# Patient Record
Sex: FEMALE | Race: WHITE | NOT HISPANIC OR LATINO | URBAN - METROPOLITAN AREA
[De-identification: names, ages, dates, MRNs, and addresses within clinical notes are randomized per-mention and may not be internally consistent; named-entity substitution may affect disease eponyms.]

---

## 2017-05-11 ENCOUNTER — OUTPATIENT (OUTPATIENT)
Dept: OUTPATIENT SERVICES | Facility: HOSPITAL | Age: 57
LOS: 1 days | End: 2017-05-11
Payer: COMMERCIAL

## 2017-05-11 PROCEDURE — 72100 X-RAY EXAM L-S SPINE 2/3 VWS: CPT

## 2017-05-11 PROCEDURE — 72100 X-RAY EXAM L-S SPINE 2/3 VWS: CPT | Mod: 26

## 2017-09-26 ENCOUNTER — OUTPATIENT (OUTPATIENT)
Dept: OUTPATIENT SERVICES | Facility: HOSPITAL | Age: 57
LOS: 1 days | End: 2017-09-26
Payer: COMMERCIAL

## 2017-09-26 DIAGNOSIS — Z96.649 PRESENCE OF UNSPECIFIED ARTIFICIAL HIP JOINT: Chronic | ICD-10-CM

## 2017-09-26 DIAGNOSIS — J35.1 HYPERTROPHY OF TONSILS: Chronic | ICD-10-CM

## 2017-09-26 DIAGNOSIS — Z90.49 ACQUIRED ABSENCE OF OTHER SPECIFIED PARTS OF DIGESTIVE TRACT: Chronic | ICD-10-CM

## 2017-09-26 DIAGNOSIS — Q25.0 PATENT DUCTUS ARTERIOSUS: Chronic | ICD-10-CM

## 2017-09-26 DIAGNOSIS — Z90.721 ACQUIRED ABSENCE OF OVARIES, UNILATERAL: Chronic | ICD-10-CM

## 2017-09-26 PROCEDURE — 86850 RBC ANTIBODY SCREEN: CPT

## 2017-09-26 PROCEDURE — 86900 BLOOD TYPING SEROLOGIC ABO: CPT

## 2017-09-26 PROCEDURE — 87641 MR-STAPH DNA AMP PROBE: CPT

## 2017-09-26 PROCEDURE — 86901 BLOOD TYPING SEROLOGIC RH(D): CPT

## 2018-01-09 VITALS
OXYGEN SATURATION: 96 % | DIASTOLIC BLOOD PRESSURE: 80 MMHG | HEIGHT: 63 IN | WEIGHT: 136.91 LBS | HEART RATE: 92 BPM | TEMPERATURE: 98 F | SYSTOLIC BLOOD PRESSURE: 131 MMHG | RESPIRATION RATE: 16 BRPM

## 2018-01-09 NOTE — H&P ADULT - NSHPPHYSICALEXAM_GEN_ALL_CORE
Back decreased ROM secondary to pain  Rest of PE per MD clearance PE: A+O x 3  Normal head, atraumatic. Normal skin, no rashes/lesions/erythema.    MSK: Back decreased ROM secondary to pain: No deformity or open wounds. No rashes or lesions. left EHL/TA/GS 4/5, right EHL/TA/GS: 5/5. Sensation intact and equal BLE. Skin warm and well perfused. DP palpable BLE. Cap refill brisk.     Rest of PE per MD clearance

## 2018-01-09 NOTE — PATIENT PROFILE ADULT. - PMH
Deep vein thrombosis (DVT)  after colon surgery  History of colon cancer  2007 Chemo  HTN (hypertension)    Osteopenia

## 2018-01-09 NOTE — H&P ADULT - NSHPLABSRESULTS_GEN_ALL_CORE
Preop cbc, bmp, pt/inr, ptt wnl; nasal swab dos; UA dos  preop cxr wnl per clearance  preop ekg wnl per clearance

## 2018-01-09 NOTE — PATIENT PROFILE ADULT. - PSH
History of oophorectomy  bilateral 2007 ( preventative)  History of spinal surgery  2004  History of strabismus surgery    History of tonsillectomy    History of total hip replacement  right  PDA (patent ductus arteriosus)  age 9  S/P partial colectomy  2007

## 2018-01-09 NOTE — H&P ADULT - HISTORY OF PRESENT ILLNESS
57F Hx PSF L4-5 c/o back pain  Presents today for Lumbar BETY/Reinstrumentation L4-5 with PSF/TLIF. 57F Hx PSF L4-5 c/o back pain x 4 months that is progressively worsening. Pain radiates to left buttock and calf, denies numbness/tingling. Patient is an independent ambulator. Patient states she had initial lumbar spine surgery in 2005 with relief of pain, current pain began on its own without any trauma at onset. Patient seen sitting in chair this morning in no acute distress Denies f/c/n/v/c/d, or hx of dvt. States she feels otherwise well.     Presents today for Lumbar BETY/Reinstrumentation L4-5 with PSF/TLIF.

## 2018-01-10 ENCOUNTER — INPATIENT (INPATIENT)
Facility: HOSPITAL | Age: 58
LOS: 2 days | Discharge: HOME | DRG: 460 | End: 2018-01-13
Attending: ORTHOPAEDIC SURGERY | Admitting: ORTHOPAEDIC SURGERY
Payer: COMMERCIAL

## 2018-01-10 DIAGNOSIS — Z98.1 ARTHRODESIS STATUS: ICD-10-CM

## 2018-01-10 DIAGNOSIS — Z98.890 OTHER SPECIFIED POSTPROCEDURAL STATES: Chronic | ICD-10-CM

## 2018-01-10 DIAGNOSIS — Z88.2 ALLERGY STATUS TO SULFONAMIDES: ICD-10-CM

## 2018-01-10 DIAGNOSIS — Z85.038 PERSONAL HISTORY OF OTHER MALIGNANT NEOPLASM OF LARGE INTESTINE: ICD-10-CM

## 2018-01-10 DIAGNOSIS — Z88.0 ALLERGY STATUS TO PENICILLIN: ICD-10-CM

## 2018-01-10 DIAGNOSIS — Z96.641 PRESENCE OF RIGHT ARTIFICIAL HIP JOINT: ICD-10-CM

## 2018-01-10 DIAGNOSIS — M54.16 RADICULOPATHY, LUMBAR REGION: ICD-10-CM

## 2018-01-10 DIAGNOSIS — E53.8 DEFICIENCY OF OTHER SPECIFIED B GROUP VITAMINS: ICD-10-CM

## 2018-01-10 DIAGNOSIS — M85.80 OTHER SPECIFIED DISORDERS OF BONE DENSITY AND STRUCTURE, UNSPECIFIED SITE: ICD-10-CM

## 2018-01-10 DIAGNOSIS — I10 ESSENTIAL (PRIMARY) HYPERTENSION: ICD-10-CM

## 2018-01-10 DIAGNOSIS — Z90.721 ACQUIRED ABSENCE OF OVARIES, UNILATERAL: Chronic | ICD-10-CM

## 2018-01-10 DIAGNOSIS — Q25.0 PATENT DUCTUS ARTERIOSUS: Chronic | ICD-10-CM

## 2018-01-10 DIAGNOSIS — Z96.649 PRESENCE OF UNSPECIFIED ARTIFICIAL HIP JOINT: Chronic | ICD-10-CM

## 2018-01-10 DIAGNOSIS — M54.9 DORSALGIA, UNSPECIFIED: ICD-10-CM

## 2018-01-10 DIAGNOSIS — Z90.89 ACQUIRED ABSENCE OF OTHER ORGANS: Chronic | ICD-10-CM

## 2018-01-10 DIAGNOSIS — Z90.49 ACQUIRED ABSENCE OF OTHER SPECIFIED PARTS OF DIGESTIVE TRACT: Chronic | ICD-10-CM

## 2018-01-10 DIAGNOSIS — Z86.718 PERSONAL HISTORY OF OTHER VENOUS THROMBOSIS AND EMBOLISM: ICD-10-CM

## 2018-01-10 LAB
ANION GAP SERPL CALC-SCNC: 13 MMOL/L — SIGNIFICANT CHANGE UP (ref 5–17)
APPEARANCE UR: CLEAR — SIGNIFICANT CHANGE UP
BACTERIA # UR AUTO: PRESENT /HPF
BASOPHILS NFR BLD AUTO: 0.1 % — SIGNIFICANT CHANGE UP (ref 0–2)
BILIRUB UR-MCNC: NEGATIVE — SIGNIFICANT CHANGE UP
BUN SERPL-MCNC: 17 MG/DL — SIGNIFICANT CHANGE UP (ref 7–23)
CALCIUM SERPL-MCNC: 8.8 MG/DL — SIGNIFICANT CHANGE UP (ref 8.4–10.5)
CHLORIDE SERPL-SCNC: 105 MMOL/L — SIGNIFICANT CHANGE UP (ref 96–108)
CO2 SERPL-SCNC: 23 MMOL/L — SIGNIFICANT CHANGE UP (ref 22–31)
COLOR SPEC: YELLOW — SIGNIFICANT CHANGE UP
COMMENT - URINE: SIGNIFICANT CHANGE UP
CREAT SERPL-MCNC: 1.04 MG/DL — SIGNIFICANT CHANGE UP (ref 0.5–1.3)
DIFF PNL FLD: (no result)
EOSINOPHIL NFR BLD AUTO: 0.1 % — SIGNIFICANT CHANGE UP (ref 0–6)
EPI CELLS # UR: SIGNIFICANT CHANGE UP /HPF (ref 0–5)
GLUCOSE SERPL-MCNC: 155 MG/DL — HIGH (ref 70–99)
GLUCOSE UR QL: NEGATIVE — SIGNIFICANT CHANGE UP
HCT VFR BLD CALC: 34.7 % — SIGNIFICANT CHANGE UP (ref 34.5–45)
HGB BLD-MCNC: 11.5 G/DL — SIGNIFICANT CHANGE UP (ref 11.5–15.5)
KETONES UR-MCNC: NEGATIVE — SIGNIFICANT CHANGE UP
LEUKOCYTE ESTERASE UR-ACNC: (no result)
LYMPHOCYTES # BLD AUTO: 7.4 % — LOW (ref 13–44)
MCHC RBC-ENTMCNC: 29.7 PG — SIGNIFICANT CHANGE UP (ref 27–34)
MCHC RBC-ENTMCNC: 33.1 G/DL — SIGNIFICANT CHANGE UP (ref 32–36)
MCV RBC AUTO: 89.7 FL — SIGNIFICANT CHANGE UP (ref 80–100)
MONOCYTES NFR BLD AUTO: 3.8 % — SIGNIFICANT CHANGE UP (ref 2–14)
MRSA PCR RESULT.: NEGATIVE — SIGNIFICANT CHANGE UP
NEUTROPHILS NFR BLD AUTO: 88.6 % — HIGH (ref 43–77)
NITRITE UR-MCNC: NEGATIVE — SIGNIFICANT CHANGE UP
PH UR: 5.5 — SIGNIFICANT CHANGE UP (ref 5–8)
PLATELET # BLD AUTO: 285 K/UL — SIGNIFICANT CHANGE UP (ref 150–400)
POTASSIUM SERPL-MCNC: 4.5 MMOL/L — SIGNIFICANT CHANGE UP (ref 3.5–5.3)
POTASSIUM SERPL-SCNC: 4.5 MMOL/L — SIGNIFICANT CHANGE UP (ref 3.5–5.3)
PROT UR-MCNC: NEGATIVE MG/DL — SIGNIFICANT CHANGE UP
RBC # BLD: 3.87 M/UL — SIGNIFICANT CHANGE UP (ref 3.8–5.2)
RBC # FLD: 12.6 % — SIGNIFICANT CHANGE UP (ref 10.3–16.9)
RBC CASTS # UR COMP ASSIST: < 5 /HPF — SIGNIFICANT CHANGE UP
S AUREUS DNA NOSE QL NAA+PROBE: POSITIVE
SODIUM SERPL-SCNC: 141 MMOL/L — SIGNIFICANT CHANGE UP (ref 135–145)
SP GR SPEC: 1.02 — SIGNIFICANT CHANGE UP (ref 1–1.03)
UROBILINOGEN FLD QL: 0.2 E.U./DL — SIGNIFICANT CHANGE UP
WBC # BLD: 11.6 K/UL — HIGH (ref 3.8–10.5)
WBC # FLD AUTO: 11.6 K/UL — HIGH (ref 3.8–10.5)
WBC UR QL: (no result) /HPF

## 2018-01-10 RX ORDER — MORPHINE SULFATE 50 MG/1
30 CAPSULE, EXTENDED RELEASE ORAL
Qty: 0 | Refills: 0 | Status: DISCONTINUED | OUTPATIENT
Start: 2018-01-10 | End: 2018-01-11

## 2018-01-10 RX ORDER — PREGABALIN 225 MG/1
250 CAPSULE ORAL DAILY
Qty: 0 | Refills: 0 | Status: DISCONTINUED | OUTPATIENT
Start: 2018-01-10 | End: 2018-01-13

## 2018-01-10 RX ORDER — SODIUM CHLORIDE 9 MG/ML
1000 INJECTION, SOLUTION INTRAVENOUS
Qty: 0 | Refills: 0 | Status: DISCONTINUED | OUTPATIENT
Start: 2018-01-10 | End: 2018-01-13

## 2018-01-10 RX ORDER — ONDANSETRON 8 MG/1
4 TABLET, FILM COATED ORAL EVERY 6 HOURS
Qty: 0 | Refills: 0 | Status: DISCONTINUED | OUTPATIENT
Start: 2018-01-10 | End: 2018-01-10

## 2018-01-10 RX ORDER — ACETAMINOPHEN 500 MG
1000 TABLET ORAL ONCE
Qty: 0 | Refills: 0 | Status: DISCONTINUED | OUTPATIENT
Start: 2018-01-10 | End: 2018-01-13

## 2018-01-10 RX ORDER — HYDROMORPHONE HYDROCHLORIDE 2 MG/ML
1 INJECTION INTRAMUSCULAR; INTRAVENOUS; SUBCUTANEOUS
Qty: 0 | Refills: 0 | Status: DISCONTINUED | OUTPATIENT
Start: 2018-01-10 | End: 2018-01-10

## 2018-01-10 RX ORDER — ACETAMINOPHEN 500 MG
650 TABLET ORAL EVERY 6 HOURS
Qty: 0 | Refills: 0 | Status: DISCONTINUED | OUTPATIENT
Start: 2018-01-10 | End: 2018-01-13

## 2018-01-10 RX ORDER — SENNA PLUS 8.6 MG/1
2 TABLET ORAL AT BEDTIME
Qty: 0 | Refills: 0 | Status: DISCONTINUED | OUTPATIENT
Start: 2018-01-10 | End: 2018-01-13

## 2018-01-10 RX ORDER — DOCUSATE SODIUM 100 MG
100 CAPSULE ORAL THREE TIMES A DAY
Qty: 0 | Refills: 0 | Status: DISCONTINUED | OUTPATIENT
Start: 2018-01-10 | End: 2018-01-13

## 2018-01-10 RX ORDER — AMLODIPINE BESYLATE 2.5 MG/1
1 TABLET ORAL
Qty: 0 | Refills: 0 | COMMUNITY

## 2018-01-10 RX ORDER — PREGABALIN 225 MG/1
1 CAPSULE ORAL
Qty: 0 | Refills: 0 | COMMUNITY

## 2018-01-10 RX ORDER — AMLODIPINE BESYLATE 2.5 MG/1
5 TABLET ORAL DAILY
Qty: 0 | Refills: 0 | Status: DISCONTINUED | OUTPATIENT
Start: 2018-01-10 | End: 2018-01-13

## 2018-01-10 RX ORDER — NALOXONE HYDROCHLORIDE 4 MG/.1ML
0.1 SPRAY NASAL
Qty: 0 | Refills: 0 | Status: DISCONTINUED | OUTPATIENT
Start: 2018-01-10 | End: 2018-01-13

## 2018-01-10 RX ORDER — BUPIVACAINE 13.3 MG/ML
20 INJECTION, SUSPENSION, LIPOSOMAL INFILTRATION ONCE
Qty: 0 | Refills: 0 | Status: DISCONTINUED | OUTPATIENT
Start: 2018-01-10 | End: 2018-01-13

## 2018-01-10 RX ORDER — MAGNESIUM HYDROXIDE 400 MG/1
30 TABLET, CHEWABLE ORAL EVERY 12 HOURS
Qty: 0 | Refills: 0 | Status: DISCONTINUED | OUTPATIENT
Start: 2018-01-10 | End: 2018-01-13

## 2018-01-10 RX ORDER — ONDANSETRON 8 MG/1
4 TABLET, FILM COATED ORAL EVERY 6 HOURS
Qty: 0 | Refills: 0 | Status: DISCONTINUED | OUTPATIENT
Start: 2018-01-10 | End: 2018-01-13

## 2018-01-10 RX ORDER — MORPHINE SULFATE 50 MG/1
2 CAPSULE, EXTENDED RELEASE ORAL
Qty: 0 | Refills: 0 | Status: DISCONTINUED | OUTPATIENT
Start: 2018-01-10 | End: 2018-01-11

## 2018-01-10 RX ORDER — PANTOPRAZOLE SODIUM 20 MG/1
40 TABLET, DELAYED RELEASE ORAL
Qty: 0 | Refills: 0 | Status: DISCONTINUED | OUTPATIENT
Start: 2018-01-10 | End: 2018-01-13

## 2018-01-10 RX ORDER — HYDROMORPHONE HYDROCHLORIDE 2 MG/ML
0.5 INJECTION INTRAMUSCULAR; INTRAVENOUS; SUBCUTANEOUS
Qty: 0 | Refills: 0 | Status: DISCONTINUED | OUTPATIENT
Start: 2018-01-10 | End: 2018-01-11

## 2018-01-10 RX ORDER — ACETAMINOPHEN 500 MG
650 TABLET ORAL EVERY 6 HOURS
Qty: 0 | Refills: 0 | Status: DISCONTINUED | OUTPATIENT
Start: 2018-01-10 | End: 2018-01-11

## 2018-01-10 RX ORDER — ACETAMINOPHEN 500 MG
975 TABLET ORAL EVERY 8 HOURS
Qty: 0 | Refills: 0 | Status: DISCONTINUED | OUTPATIENT
Start: 2018-01-10 | End: 2018-01-13

## 2018-01-10 RX ORDER — CHOLECALCIFEROL (VITAMIN D3) 125 MCG
2000 CAPSULE ORAL DAILY
Qty: 0 | Refills: 0 | Status: DISCONTINUED | OUTPATIENT
Start: 2018-01-10 | End: 2018-01-13

## 2018-01-10 RX ORDER — METOCLOPRAMIDE HCL 10 MG
10 TABLET ORAL EVERY 8 HOURS
Qty: 0 | Refills: 0 | Status: DISCONTINUED | OUTPATIENT
Start: 2018-01-10 | End: 2018-01-13

## 2018-01-10 RX ADMIN — MORPHINE SULFATE 30 MILLILITER(S): 50 CAPSULE, EXTENDED RELEASE ORAL at 13:31

## 2018-01-10 RX ADMIN — MORPHINE SULFATE 2 MILLIGRAM(S): 50 CAPSULE, EXTENDED RELEASE ORAL at 14:25

## 2018-01-10 RX ADMIN — Medication 100 MILLIGRAM(S): at 18:05

## 2018-01-10 RX ADMIN — SODIUM CHLORIDE 140 MILLILITER(S): 9 INJECTION, SOLUTION INTRAVENOUS at 15:31

## 2018-01-10 NOTE — PROGRESS NOTE ADULT - SUBJECTIVE AND OBJECTIVE BOX
Ortho Post Op Check    Procedure: L4-5 TLIF, BETY, Laminectomy, PSF  Surgeon: Marie  Laterality:    Pt comfortable without complaints, pain controlled  Denies CP, SOB, N/V, numbness/tingling     Vital Signs Last 24 Hrs  T(C): --  T(F): --  HR: 78 (01-10-18 @ 20:00) (76 - 82)  BP: 103/64 (01-10-18 @ 20:00) (90/54 - 117/63)  BP(mean): 82 (01-10-18 @ 20:00) (67 - 83)  RR: 16 (01-10-18 @ 20:00) (12 - 16)  SpO2: 98% (01-10-18 @ 20:00) (96% - 99%)  AVSS    General: Pt Alert and oriented, NAD  NAD, non labored respirations  Affected extremity:          Dressing: clean/dry/intact          Drains: 1         Sensation: [ x] intact to light touch  [ ] decreased                              Vascular: [x ] warm well perfused; capillary refill <3 seconds          Motor exam: [x ]         [ ] Upper extremity                   Beau (c5)   Bi(c5/6)   WE(c6)   EE(c7)    (c8)                                                R           5              5            5             5             5                                               L            5              5            5             5            5         [x ] Lower extremity                   IP(L2)     Q(L3)     TA(L4)     EHL(L5)     GS(s1)                                                 R          5           5          5            5              5                                               L           5           5         5             5              5                            11.5   11.6  )-----------( 285      ( 10 Isaias 2018 15:45 )             34.7   10 Isaias 2018 15:45    141    |  105    |  17     ----------------------------<  155    4.5     |  23     |  1.04     Ca    8.8        10 Isaias 2018 15:45        Post-op X-Ray: Good positioning of the implant    A/P: 57yFemale POD#0 s/p   - Stable  - Pain Control  - DVT ppx: SCDs  - Post op abx: Clinda  - PT, WBS: WBAT    Ortho Pager [phone]

## 2018-01-10 NOTE — CONSULT NOTE ADULT - SUBJECTIVE AND OBJECTIVE BOX
Pain Management Consult Note - [facility] [phone]    Chief Complaint: Back pain     HPI:  This is a 56 y/o with h/o HTN, Osteopenia, OA s/p R THR, Colon ca s/p partial colectomy with chemo in 2007, DVT after colectomy, back pain s/p PSF in 2004 with worsen back pain  presents for BETY/ Reinstrumentation L4-5, PSF/TLIF today.  Per I-stop, Tramadol 50mg q4h and Vicodin 7.5/300 q4h recently. She drinks wine >4times/week and denies smoking.       Pain is ___ sharp ____dull ___burning ___achy ___ Intensity: ____ mild ___mod ___severe     Location ____surgical site ____cervical _____lumbar ____abd ____upper ext____lower ext    Worse with ____activity ____movement _____physical therapy___ Rest    Improved with ____medication ____rest ____physical therapy    ROS: Const:  ___febrile   Eyes:___ENT:___CV: ___chest pain  Resp: ____sob  GI:___nausea ___vomiting ___abd pain ___npo ___clears __full diet __bm  :___ Musk: ___pain ___spasm  Skin:___ Neuro:  ___sedation___confusion___ numbness ___weakness ___paresth  Psych:__anxiety  Endo:___ Heme:___Allergy:_________, ___all others reviewed and negative    PAST MEDICAL & SURGICAL HISTORY:  HTN (hypertension)  Osteopenia  Deep vein thrombosis (DVT): after colon surgery  History of colon cancer: 2007 Chemo  History of spinal surgery: 2004  History of strabismus surgery  History of tonsillectomy  PDA (patent ductus arteriosus): age 9  History of oophorectomy: bilateral 2007 ( preventative)  History of total hip replacement: right  S/P partial colectomy: 2007      SH: ___Tobacco   ___Alcohol                          FH:FAMILY HISTORY:      BUpivacaine liposome 1.3% Injectable (no eMAR) 20 milliLiter(s) Local Injection once      T(C): --  HR: --  BP: --  RR: --  SpO2: --  Wt(kg): --    T(C): --  HR: --  BP: --  RR: --  SpO2: --  Wt(kg): --    T(C): --  HR: --  BP: --  RR: --  SpO2: --  Wt(kg): --    PHYSICAL EXAM:  Gen Appearance: ___no acute distress ___appropriate        Neuro: ___SILT feet____ EOM Intact Psych: AAOX__, ___mood/affect appropriate        Eyes: ___conjunctiva WNL  _____ Pupils equal and round        ENT: ___ears and nose atraumatic___ Hearing grossly intact        Neck: ___trachea midline, no visible masses ___thyroid without palpable mass    Resp: ___Nml WOB____No tactile fremitus ___clear to auscultation    Cardio: ___extremities free from edema ____pedal pulses palpable    GI/Abdomen: ___soft _____ Nontender______Nondistended_____HSM    Lymphatic: ___no palpable nodes in neck  ___no palpable nodes calves and feet    Skin/Wound: ___Incision, ___Dressing c/d/i,   ____surrounding tissues soft,  ___drain/chest tube present____    Muscular: EHL ___/5  Gastrocnemius___/5    ___absent clubbing/cyanosis      ASSESSMENT: This is a 57y old Female with a history of   M48.061  No h/o HF  Handoff  HTN (hypertension)  Osteopenia  Deep vein thrombosis (DVT)  History of colon cancer  History of fusion of lumbar spine  History of spinal surgery  History of strabismus surgery  History of tonsillectomy  PDA (patent ductus arteriosus)  Enlargement of tonsils  History of oophorectomy  History of total hip replacement  S/P partial colectomy  with worsen back pain  presents for BETY/ Reinstrumentation L4-5, PSF/TLIF today.        Recommended Treatment PLAN: Pain Management Consult Note - [facility] [phone]    Chief Complaint: Back pain     HPI:  This is a 58 y/o with h/o HTN, Osteopenia, OA s/p R THR, Colon ca s/p partial colectomy with chemo in 2007, DVT after colectomy, back pain s/p PSF in 2004 with worsen back pain  presents for BETY/ Reinstrumentation L4-5, PSF/TLIF today. Patient is arousable and able to respond to verbal orders at PACU. Per I-stop, Tramadol 50mg q4h and Vicodin 7.5/300 q4h were prescribed but patient states that she dose not take them anymore because they did not help her pain. She takes only Ibuprofen for her pain ar home.  Patient reports that she had weakness LLE before her sx. She drinks 2 glasses of wine >4times/week and denies smoking.       Pain is ___ sharp ____dull ___burning __x_achy ___ Intensity: ____ mild ___mod ___severe     Location __x__surgical site ____cervical _____lumbar ____abd ____upper ext____lower ext    Worse with ____activity __x__movement _____physical therapy___ Rest    Improved with ____medication _x___rest ____physical therapy    ROS: Const:  _-__febrile   Eyes:___ENT:___CV: ___chest pain  Resp: ____sob  GI:_-__nausea _-__vomiting _-__abd pain ___npo ___clears __full diet __bm  :___ Musk: _+__pain ___spasm  Skin:___ Neuro:  ___sedation___confusion_-__ numbness _+__weakness ___paresth  Psych:__anxiety  Endo:___ Heme:___Allergy:_________, ___all others reviewed and negative    PAST MEDICAL & SURGICAL HISTORY:  HTN (hypertension)  Osteopenia  Deep vein thrombosis (DVT): after colon surgery  History of colon cancer: 2007 Chemo  History of spinal surgery: 2004  History of strabismus surgery  History of tonsillectomy  PDA (patent ductus arteriosus): age 9  History of oophorectomy: bilateral 2007 ( preventative)  History of total hip replacement: right  S/P partial colectomy: 2007      SH: -___Tobacco   _+__Alcohol                          FH:FAMILY HISTORY:      BUpivacaine liposome 1.3% Injectable (no eMAR) 20 milliLiter(s) Local Injection once      PHYSICAL EXAM:  Gen Appearance: _x__no acute distress _x__appropriate        Neuro: __x_SILT feet___x_ EOM Intact Psych: AAOX3__, __x_mood/affect appropriate        Eyes: __x_conjunctiva WNL  _____ Pupils equal and round        ENT: _x__ears and nose atraumatic___ Hearing grossly intact        Neck: _x__trachea midline, no visible masses ___thyroid without palpable mass    Resp: ___Nml WOB____No tactile fremitus ___clear to auscultation    Cardio: _x__extremities free from edema ____pedal pulses palpable    GI/Abdomen: __x_soft __x___ Nontender______Nondistended_____HSM    Lymphatic: ___no palpable nodes in neck  ___no palpable nodes calves and feet    Skin/Wound: ___Incision, ___Dressing c/d/i,   ____surrounding tissues soft,  ___drain/chest tube present____    Muscular: EHL _5__/5  Gastrocnemius__5_/5    _x__absent clubbing/cyanosis      ASSESSMENT: This is a 57y old Female with a history of   M48.061  No h/o HF  Handoff  HTN (hypertension)  Osteopenia  Deep vein thrombosis (DVT)  History of colon cancer  History of fusion of lumbar spine  History of spinal surgery  History of strabismus surgery  History of tonsillectomy  PDA (patent ductus arteriosus)  Enlargement of tonsils  History of oophorectomy  History of total hip replacement  S/P partial colectomy  with worsen back pain s/p BETY/ Reinstrumentation L4-5, PSF/TLIF today. Low SBP 80-90's noted post op at PACU.      Recommended Treatment PLAN:  1. Dilaudid 0.5mg IV prn only in PACU, hold if SBP<95 or O2sat <90%  2. Morphine PCA 0/1mg/6mins/2mg bolus q2h prn  3. Tylenol 1G IV once then Tylenol 975mg po q8h  3. Valium po order for muscle spasm per ortho

## 2018-01-10 NOTE — PROGRESS NOTE ADULT - SUBJECTIVE AND OBJECTIVE BOX
Ortho Post Op Check    Procedure: BETY, Laminoplasty, TLIF L4-L5  Surgeon: Dr. Salazar    Pt resting comfortably without complaints, pain endorsed but controlled in low back.  Denies CP, SOB, N/V, numbness/tingling of extremities.    Vital Signs Last 24 Hrs  T(C): 36.2 (01-10-18 @ 13:25), Max: 36.2 (01-10-18 @ 13:25)  T(F): 97.1 (01-10-18 @ 13:25), Max: 97.1 (01-10-18 @ 13:25)  HR: 92 (01-10-18 @ 13:40) (84 - 94)  BP: 102/56 (01-10-18 @ 13:40) (66/36 - 102/56)  BP(mean): 76 (01-10-18 @ 13:40) (50 - 76)  RR: 13 (01-10-18 @ 13:40) (13 - 17)  SpO2: 100% (01-10-18 @ 13:40) (98% - 100%)  AVSS    General: Pt Alert and oriented, NAD  DSG C/D/I lumbar spine, 1 HV  Pulses: DP pulses 2+, toes wwp, cap refill brisk  Sensation: intact and equal to light touch distally  Motor: EHL/FHL/TA/GS 5/5 strength b/l          A/P: 57yFemale POD#0 s/p BETY, Laminoplasty, TLIF L4-L5  - Stable  - Pain Control  - DVT ppx: SCDs  - Post op abx: Clinda  - PT, WBS: WBAT/OOB  - F/u AM labs    Ortho Pager [phone]

## 2018-01-11 LAB
ANION GAP SERPL CALC-SCNC: 9 MMOL/L — SIGNIFICANT CHANGE UP (ref 5–17)
BASOPHILS NFR BLD AUTO: 0.1 % — SIGNIFICANT CHANGE UP (ref 0–2)
BUN SERPL-MCNC: 13 MG/DL — SIGNIFICANT CHANGE UP (ref 7–23)
CALCIUM SERPL-MCNC: 8.9 MG/DL — SIGNIFICANT CHANGE UP (ref 8.4–10.5)
CHLORIDE SERPL-SCNC: 103 MMOL/L — SIGNIFICANT CHANGE UP (ref 96–108)
CO2 SERPL-SCNC: 27 MMOL/L — SIGNIFICANT CHANGE UP (ref 22–31)
CREAT SERPL-MCNC: 0.89 MG/DL — SIGNIFICANT CHANGE UP (ref 0.5–1.3)
EOSINOPHIL NFR BLD AUTO: 0 % — SIGNIFICANT CHANGE UP (ref 0–6)
GLUCOSE SERPL-MCNC: 110 MG/DL — HIGH (ref 70–99)
HCT VFR BLD CALC: 30 % — LOW (ref 34.5–45)
HGB BLD-MCNC: 9.9 G/DL — LOW (ref 11.5–15.5)
LYMPHOCYTES # BLD AUTO: 15.8 % — SIGNIFICANT CHANGE UP (ref 13–44)
MCHC RBC-ENTMCNC: 30.1 PG — SIGNIFICANT CHANGE UP (ref 27–34)
MCHC RBC-ENTMCNC: 33 G/DL — SIGNIFICANT CHANGE UP (ref 32–36)
MCV RBC AUTO: 91.2 FL — SIGNIFICANT CHANGE UP (ref 80–100)
MONOCYTES NFR BLD AUTO: 9.5 % — SIGNIFICANT CHANGE UP (ref 2–14)
NEUTROPHILS NFR BLD AUTO: 74.6 % — SIGNIFICANT CHANGE UP (ref 43–77)
PLATELET # BLD AUTO: 218 K/UL — SIGNIFICANT CHANGE UP (ref 150–400)
POTASSIUM SERPL-MCNC: 3.9 MMOL/L — SIGNIFICANT CHANGE UP (ref 3.5–5.3)
POTASSIUM SERPL-SCNC: 3.9 MMOL/L — SIGNIFICANT CHANGE UP (ref 3.5–5.3)
RBC # BLD: 3.29 M/UL — LOW (ref 3.8–5.2)
RBC # FLD: 13 % — SIGNIFICANT CHANGE UP (ref 10.3–16.9)
SODIUM SERPL-SCNC: 139 MMOL/L — SIGNIFICANT CHANGE UP (ref 135–145)
SURGICAL PATHOLOGY STUDY: SIGNIFICANT CHANGE UP
WBC # BLD: 10.8 K/UL — HIGH (ref 3.8–10.5)
WBC # FLD AUTO: 10.8 K/UL — HIGH (ref 3.8–10.5)

## 2018-01-11 RX ORDER — HYDROMORPHONE HYDROCHLORIDE 2 MG/ML
0.5 INJECTION INTRAMUSCULAR; INTRAVENOUS; SUBCUTANEOUS EVERY 4 HOURS
Qty: 0 | Refills: 0 | Status: DISCONTINUED | OUTPATIENT
Start: 2018-01-11 | End: 2018-01-13

## 2018-01-11 RX ORDER — OXYCODONE HYDROCHLORIDE 5 MG/1
10 TABLET ORAL EVERY 4 HOURS
Qty: 0 | Refills: 0 | Status: DISCONTINUED | OUTPATIENT
Start: 2018-01-11 | End: 2018-01-13

## 2018-01-11 RX ORDER — OXYCODONE HYDROCHLORIDE 5 MG/1
5 TABLET ORAL EVERY 4 HOURS
Qty: 0 | Refills: 0 | Status: DISCONTINUED | OUTPATIENT
Start: 2018-01-11 | End: 2018-01-13

## 2018-01-11 RX ADMIN — PANTOPRAZOLE SODIUM 40 MILLIGRAM(S): 20 TABLET, DELAYED RELEASE ORAL at 06:15

## 2018-01-11 RX ADMIN — Medication 975 MILLIGRAM(S): at 16:15

## 2018-01-11 RX ADMIN — Medication 975 MILLIGRAM(S): at 15:06

## 2018-01-11 RX ADMIN — Medication 100 MILLIGRAM(S): at 21:18

## 2018-01-11 RX ADMIN — Medication 100 MILLIGRAM(S): at 15:06

## 2018-01-11 RX ADMIN — OXYCODONE HYDROCHLORIDE 10 MILLIGRAM(S): 5 TABLET ORAL at 12:34

## 2018-01-11 RX ADMIN — Medication 975 MILLIGRAM(S): at 22:01

## 2018-01-11 RX ADMIN — OXYCODONE HYDROCHLORIDE 10 MILLIGRAM(S): 5 TABLET ORAL at 21:17

## 2018-01-11 RX ADMIN — Medication 975 MILLIGRAM(S): at 06:15

## 2018-01-11 RX ADMIN — SENNA PLUS 2 TABLET(S): 8.6 TABLET ORAL at 21:17

## 2018-01-11 RX ADMIN — PREGABALIN 250 MICROGRAM(S): 225 CAPSULE ORAL at 12:30

## 2018-01-11 RX ADMIN — Medication 1 TABLET(S): at 12:30

## 2018-01-11 RX ADMIN — MAGNESIUM HYDROXIDE 30 MILLILITER(S): 400 TABLET, CHEWABLE ORAL at 21:18

## 2018-01-11 RX ADMIN — OXYCODONE HYDROCHLORIDE 10 MILLIGRAM(S): 5 TABLET ORAL at 16:39

## 2018-01-11 RX ADMIN — AMLODIPINE BESYLATE 5 MILLIGRAM(S): 2.5 TABLET ORAL at 06:15

## 2018-01-11 RX ADMIN — Medication 2000 UNIT(S): at 12:30

## 2018-01-11 RX ADMIN — OXYCODONE HYDROCHLORIDE 10 MILLIGRAM(S): 5 TABLET ORAL at 22:01

## 2018-01-11 RX ADMIN — Medication 100 MILLIGRAM(S): at 02:05

## 2018-01-11 RX ADMIN — MAGNESIUM HYDROXIDE 30 MILLILITER(S): 400 TABLET, CHEWABLE ORAL at 09:49

## 2018-01-11 RX ADMIN — OXYCODONE HYDROCHLORIDE 10 MILLIGRAM(S): 5 TABLET ORAL at 13:34

## 2018-01-11 RX ADMIN — OXYCODONE HYDROCHLORIDE 10 MILLIGRAM(S): 5 TABLET ORAL at 17:35

## 2018-01-11 RX ADMIN — Medication 975 MILLIGRAM(S): at 21:18

## 2018-01-11 RX ADMIN — Medication 100 MILLIGRAM(S): at 06:15

## 2018-01-11 NOTE — PROGRESS NOTE ADULT - SUBJECTIVE AND OBJECTIVE BOX
Patient seen and examined at bedside.     SUBJECTIVE: No acute events overnight.  Pain tolerable.    Denies Chest Pain/SOB.    Denies Headache.    Denies Nausea/vomiting.      OBJECTIVE:   T(C): 36.7 (01-11-18 @ 04:49), Max: 36.7 (01-11-18 @ 04:49)  T(F): 98.1 (01-11-18 @ 04:49), Max: 98.1 (01-11-18 @ 04:49)  HR: 88 (01-11-18 @ 04:49) (76 - 94)  BP: 107/69 (01-11-18 @ 04:49) (66/36 - 117/63)  RR:  (12 - 17)  SpO2:  (95% - 100%)  Wt(kg): --    NAD, non labored respirations  Affected extremity:          Dressing: clean/dry/intact          Drains: 1         Sensation: [x ] intact to light touch  [ ] decreased                              Vascular: [x ] warm well perfused; capillary refill <3 seconds          Motor exam: [x ]         [ ] Upper extremity                   Beau (c5)   Bi(c5/6)   WE(c6)   EE(c7)    (c8)                                                R           5              5            5             5             5                                               L            5              5            5             5            5         [x ] Lower extremity                   IP(L2)     Q(L3)     TA(L4)     EHL(L5)     GS(s1)                                                 R          5           5          5            5              5                                               L           5           5         5             5              5                                     11.5   11.6<H> )-------------------( 285      ( 01-10 @ 15:45 )                 34.7     I&O's Detail    10 Isaias 2018 07:01  -  11 Jan 2018 06:06  --------------------------------------------------------  IN:    lactated ringers.: 3080 mL  Total IN: 3080 mL    OUT:    Accordian: 160 mL    Estimated Blood Loss: 200 mL    Indwelling Catheter - Urethral: 1673 mL  Total OUT: 2033 mL    Total NET: 1047 mL          A/P :  57y Female s/p Rev Lumbar Laminectomy/PSF/TLIF L4-5       -    Pain control + bowel regimen  -    DVT ppx: SCDs    -    Periop abx    -    ADAT  -    Check AM labs  -    Weight bearing status:   WBAT        -    Physical Therapy  -    Resume home meds  -    Dispo planning Statement Selected

## 2018-01-11 NOTE — DISCHARGE NOTE ADULT - MEDICATION SUMMARY - MEDICATIONS TO TAKE
I will START or STAY ON the medications listed below when I get home from the hospital:    oxyCODONE-acetaminophen 10 mg-325 mg oral tablet  -- 0.5-1 tab(s) by mouth every 4 hours, As Needed -for moderate to severe pain MDD:6   -- Caution federal law prohibits the transfer of this drug to any person other  than the person for whom it was prescribed.  May cause drowsiness.  Alcohol may intensify this effect.  Use care when operating dangerous machinery.  This prescription cannot be refilled.  This product contains acetaminophen.  Do not use  with any other product containing acetaminophen to prevent possible liver damage.  Using more of this medication than prescribed may cause serious breathing problems.    -- Indication: For Pain    amLODIPine 5 mg oral tablet  -- 1 tab(s) by mouth once a day  -- Indication: For Home    docusate sodium 100 mg oral capsule  -- 1 cap(s) by mouth 3 times a day  -- Indication: For stool softner    senna oral tablet  -- 2 tab(s) by mouth once a day (at bedtime)  -- Indication: For stool softner    Calcium 600+D 600 mg-200 intl units oral tablet  -- 1 tab(s) by mouth once a day  -- Indication: For Home    Vitamin B-12 250 mcg oral tablet  -- 1 tab(s) by mouth once a day  -- Indication: For Home    Vitamin D3 2000 intl units oral tablet  -- 1 tab(s) by mouth once a day  -- Indication: For Home

## 2018-01-11 NOTE — DISCHARGE NOTE ADULT - CARE PROVIDER_API CALL
Figueroa Salazar), Orthopaedic Surgery  [address] [place] [id number]  Phone: [phone]  Fax: [phone]

## 2018-01-11 NOTE — DISCHARGE NOTE ADULT - CARE PLAN
Principal Discharge DX:	History of fusion of lumbar spine  Goal:	Improvement after surgery.  Instructions for follow-up, activity and diet:	See below.

## 2018-01-11 NOTE — DISCHARGE NOTE ADULT - PATIENT PORTAL LINK FT
“You can access the [employer] Patient Portal, offered by [facility], by registering with the following website: [email or web address]”

## 2018-01-11 NOTE — PROGRESS NOTE ADULT - SUBJECTIVE AND OBJECTIVE BOX
ORTHO NOTE    [x ] Pt seen/examined.  [ ] Pt without any complaints/in NAD.    [x ] Pt complains of: incisional back pain endorsed but controlled.      ROS: [ ] Fever  [ ] Chills  [ ] CP [ ] SOB [ ] Dysnea  [ ] Palpitations [ ] Cough [ ] N/V/C/D [ ] Paresthia [ ] Other     [ ] ROS  otherwise negative    .    PHYSICAL EXAM:    Vital Signs Last 24 Hrs  T(C): 36.7 (11 Jan 2018 04:49), Max: 36.7 (11 Jan 2018 04:49)  T(F): 98.1 (11 Jan 2018 04:49), Max: 98.1 (11 Jan 2018 04:49)  HR: 88 (11 Jan 2018 04:49) (76 - 94)  BP: 107/69 (11 Jan 2018 04:49) (66/36 - 117/63)  BP(mean): 78 (10 Isaias 2018 21:00) (50 - 86)  RR: 16 (11 Jan 2018 04:49) (12 - 17)  SpO2: 95% (11 Jan 2018 04:49) (95% - 100%)    I&O's Detail    10 Isaias 2018 07:01  -  11 Jan 2018 07:00  --------------------------------------------------------  IN:    lactated ringers.: 3080 mL  Total IN: 3080 mL    OUT:    Accordian: 160 mL    Estimated Blood Loss: 200 mL    Indwelling Catheter - Urethral: 1673 mL  Total OUT: 2033 mL    Total NET: 1047 mL      11 Jan 2018 07:01  -  11 Jan 2018 12:49  --------------------------------------------------------  IN:    Oral Fluid: 200 mL  Total IN: 200 mL    OUT:    Accordian: 1000 mL  Total OUT: 1000 mL    Total NET: -800 mL           CAPILLARY BLOOD GLUCOSE                      Neuro: NAD AO x3    Lungs:    CV:    ABD: Joann    Ext: Dressing CDI  HV x1  5/5 FHL EHL GS TA  SILT WWP B/L LE    LABS   11 Jan 2018 07:14    139    |  103    |  13     ----------------------------<  110    3.9     |  27     |  0.89     Ca    8.9        11 Jan 2018 07:14                                   9.9    10.8  )-----------( 218      ( 11 Jan 2018 07:14 )             30.0                 [ ] Other Labs  [ ] None ordered            Please check or [place] when present:  •  Heart Failure:    [ ] Acute        [ ]  Acute on Chronic        [ ] Chronic         [ ] Diastolic     [ ]  Combined    •  PERCY:     [ ] ATN        [ ]  Renal medullary necrosis       [ ]  Renal cortical necrosis                  [ ] Other pathological Lesion:  •  CKD:  [ ] Stage I   [ ] Stage II  [ ] Stage III    [ ]Stage IV   [ ]  CKD V   [ ]  Other/Unspecified:    •  Abdominal Nutritional Status:   [ ] Malnutrition-See Nutrition note    [ ] Cachexia   [ ]  Other        [ ] Supplement ordered:            [ ] Morbid Obesity: BMI >=40         ASSESSMENT/PLAN:      STATUS POST: BETY, Laminoplasty, TLIF L4-5 POD1  David removed, TOV today.  HV x1 to remain.  CONTINUE:          [ ] PT WBAT    [ ] DVT PPX-SCD    [ ] Pain Mgt    [ ] Dispo plan-Pending PT evaluation

## 2018-01-11 NOTE — DISCHARGE NOTE ADULT - ADDITIONAL INSTRUCTIONS
No strenuous activity (bending/twisting), heavy lifting, driving or returning to work until cleared by MD.  Change dressing daily with gauze/tape till post-op day #5, then leave incision open to air.  You may shower post-op day#5, keep incision clean and dry.   Try to have regular bowel movements, take stool softener or laxative if necessary.  May take pepcid or zantac for upset stomach.  May apply ice to affected areas to decrease swelling.  Call to schedule an appt with Dr. Salazar for follow up, if you have staples or sutures they will be removed in office.  Contact your doctor if you experience: fever greater than 101.5, chills, chest pain, difficulty breathing, redness or excessive drainage around the incision, other concerns.   Follow up with your primary care provider.

## 2018-01-11 NOTE — DISCHARGE NOTE ADULT - HOSPITAL COURSE
Admitted  Surgery BETY, revision laminoplasty, TLIF L4-5. 1/10/18  Cady-op Antibiotics  Pain control  DVT prophylaxis  OOB/Physical Therapy

## 2018-01-12 LAB
ANION GAP SERPL CALC-SCNC: 11 MMOL/L — SIGNIFICANT CHANGE UP (ref 5–17)
BASOPHILS NFR BLD AUTO: 0.2 % — SIGNIFICANT CHANGE UP (ref 0–2)
BUN SERPL-MCNC: 9 MG/DL — SIGNIFICANT CHANGE UP (ref 7–23)
CALCIUM SERPL-MCNC: 8.7 MG/DL — SIGNIFICANT CHANGE UP (ref 8.4–10.5)
CHLORIDE SERPL-SCNC: 102 MMOL/L — SIGNIFICANT CHANGE UP (ref 96–108)
CO2 SERPL-SCNC: 26 MMOL/L — SIGNIFICANT CHANGE UP (ref 22–31)
CREAT SERPL-MCNC: 0.85 MG/DL — SIGNIFICANT CHANGE UP (ref 0.5–1.3)
EOSINOPHIL NFR BLD AUTO: 0.4 % — SIGNIFICANT CHANGE UP (ref 0–6)
GLUCOSE SERPL-MCNC: 124 MG/DL — HIGH (ref 70–99)
HCT VFR BLD CALC: 32.5 % — LOW (ref 34.5–45)
HGB BLD-MCNC: 10.3 G/DL — LOW (ref 11.5–15.5)
LYMPHOCYTES # BLD AUTO: 15.7 % — SIGNIFICANT CHANGE UP (ref 13–44)
MCHC RBC-ENTMCNC: 29.4 PG — SIGNIFICANT CHANGE UP (ref 27–34)
MCHC RBC-ENTMCNC: 31.7 G/DL — LOW (ref 32–36)
MCV RBC AUTO: 92.9 FL — SIGNIFICANT CHANGE UP (ref 80–100)
MONOCYTES NFR BLD AUTO: 10.2 % — SIGNIFICANT CHANGE UP (ref 2–14)
NEUTROPHILS NFR BLD AUTO: 73.5 % — SIGNIFICANT CHANGE UP (ref 43–77)
PLATELET # BLD AUTO: 204 K/UL — SIGNIFICANT CHANGE UP (ref 150–400)
POTASSIUM SERPL-MCNC: 3.9 MMOL/L — SIGNIFICANT CHANGE UP (ref 3.5–5.3)
POTASSIUM SERPL-SCNC: 3.9 MMOL/L — SIGNIFICANT CHANGE UP (ref 3.5–5.3)
RBC # BLD: 3.5 M/UL — LOW (ref 3.8–5.2)
RBC # FLD: 12.7 % — SIGNIFICANT CHANGE UP (ref 10.3–16.9)
SODIUM SERPL-SCNC: 139 MMOL/L — SIGNIFICANT CHANGE UP (ref 135–145)
WBC # BLD: 9.9 K/UL — SIGNIFICANT CHANGE UP (ref 3.8–10.5)
WBC # FLD AUTO: 9.9 K/UL — SIGNIFICANT CHANGE UP (ref 3.8–10.5)

## 2018-01-12 RX ADMIN — Medication 975 MILLIGRAM(S): at 07:25

## 2018-01-12 RX ADMIN — Medication 975 MILLIGRAM(S): at 21:32

## 2018-01-12 RX ADMIN — Medication 100 MILLIGRAM(S): at 14:25

## 2018-01-12 RX ADMIN — PANTOPRAZOLE SODIUM 40 MILLIGRAM(S): 20 TABLET, DELAYED RELEASE ORAL at 06:42

## 2018-01-12 RX ADMIN — PREGABALIN 250 MICROGRAM(S): 225 CAPSULE ORAL at 11:32

## 2018-01-12 RX ADMIN — Medication 975 MILLIGRAM(S): at 06:42

## 2018-01-12 RX ADMIN — Medication 100 MILLIGRAM(S): at 21:32

## 2018-01-12 RX ADMIN — SENNA PLUS 2 TABLET(S): 8.6 TABLET ORAL at 21:32

## 2018-01-12 RX ADMIN — Medication 1 TABLET(S): at 11:32

## 2018-01-12 RX ADMIN — AMLODIPINE BESYLATE 5 MILLIGRAM(S): 2.5 TABLET ORAL at 06:42

## 2018-01-12 RX ADMIN — MAGNESIUM HYDROXIDE 30 MILLILITER(S): 400 TABLET, CHEWABLE ORAL at 21:32

## 2018-01-12 RX ADMIN — Medication 975 MILLIGRAM(S): at 15:10

## 2018-01-12 RX ADMIN — Medication 100 MILLIGRAM(S): at 06:42

## 2018-01-12 RX ADMIN — Medication 975 MILLIGRAM(S): at 14:25

## 2018-01-12 RX ADMIN — Medication 975 MILLIGRAM(S): at 22:00

## 2018-01-12 RX ADMIN — Medication 2000 UNIT(S): at 11:32

## 2018-01-12 NOTE — PROGRESS NOTE ADULT - SUBJECTIVE AND OBJECTIVE BOX
Patient seen and examined at bedside.     SUBJECTIVE: No acute events overnight.  Pain tolerable.    Denies Chest Pain/SOB.    Denies Headache.    Denies Nausea/vomiting.      OBJECTIVE:   T(C): 37.6 (01-12-18 @ 05:07), Max: 37.9 (01-11-18 @ 17:46)  T(F): 99.7 (01-12-18 @ 05:07), Max: 100.2 (01-11-18 @ 17:46)  HR: 95 (01-12-18 @ 05:07) (85 - 105)  BP: 107/74 (01-12-18 @ 05:07) (107/74 - 121/78)  RR:  (16 - 16)  SpO2:  (95% - 98%)  Wt(kg): --    NAD, non labored respirations  Affected extremity:          Dressing: clean/dry/intact          Drains: 1         Sensation: [x ] intact to light touch  [ ] decreased                              Vascular: [x ] warm well perfused; capillary refill <3 seconds          Motor exam: [x ]         [ ] Upper extremity                   Beau (c5)   Bi(c5/6)   WE(c6)   EE(c7)    (c8)                                                R           5              5            5             5             5                                               L            5              5            5             5            5         [ x] Lower extremity                   IP(L2)     Q(L3)     TA(L4)     EHL(L5)     GS(s1)                                                 R          5           5          5            5              5                                               L           5           5         5             5              5                                     9.9<L>  10.8<H> )-------------------( 218      ( 01-11 @ 07:14 )                 30.0<L>    I&O's Detail    10 Isaias 2018 07:01  -  11 Jan 2018 07:00  --------------------------------------------------------  IN:    lactated ringers.: 3080 mL  Total IN: 3080 mL    OUT:    Accordian: 160 mL    Estimated Blood Loss: 200 mL    Indwelling Catheter - Urethral: 1673 mL  Total OUT: 2033 mL    Total NET: 1047 mL      11 Jan 2018 07:01  -  12 Jan 2018 06:29  --------------------------------------------------------  IN:    Oral Fluid: 680 mL  Total IN: 680 mL    OUT:    Accordian: 120 mL    Indwelling Catheter - Urethral: 1000 mL    Voided: 360 mL  Total OUT: 1480 mL    Total NET: -800 mL          A/P :  57y Female s/p BETY, Rev PSF, TLIF L4-5 1/10/18  -    Pain control + bowel regimen  -    DVT ppx: SCDs    -    Periop abx    -    ADAT  -    Check AM labs  -    Weight bearing status:   WBAT        -    Physical Therapy  -    Resume home meds  -    Dispo planning

## 2018-01-12 NOTE — PROGRESS NOTE ADULT - SUBJECTIVE AND OBJECTIVE BOX
ORTHO NOTE    [x ] Pt seen/examined.  [ ] Pt without any complaints/in NAD.    [x ] Pt complains of: Still c/o lateral right thigh numbness, unchanged.      ROS: [ ] Fever  [ ] Chills  [ ] CP [ ] SOB [ ] Dysnea  [ ] Palpitations [ ] Cough [ ] N/V/C/D [ ] Paresthia [ ] Other     [ ] ROS  otherwise negative    .    PHYSICAL EXAM:    Vital Signs Last 24 Hrs  T(C): 37.2 (12 Jan 2018 09:49), Max: 37.9 (11 Jan 2018 17:46)  T(F): 98.9 (12 Jan 2018 09:49), Max: 100.2 (11 Jan 2018 17:46)  HR: 97 (12 Jan 2018 09:49) (95 - 105)  BP: 103/68 (12 Jan 2018 09:49) (103/68 - 121/78)  BP(mean): --  RR: 16 (12 Jan 2018 09:49) (16 - 16)  SpO2: 95% (12 Jan 2018 09:49) (95% - 96%)    I&O's Detail    11 Jan 2018 07:01  -  12 Jan 2018 07:00  --------------------------------------------------------  IN:    Oral Fluid: 680 mL  Total IN: 680 mL    OUT:    Accordian: 120 mL    Indwelling Catheter - Urethral: 1000 mL    Voided: 360 mL  Total OUT: 1480 mL    Total NET: -800 mL      12 Jan 2018 07:01  -  12 Jan 2018 15:29  --------------------------------------------------------  IN:    Oral Fluid: 750 mL  Total IN: 750 mL    OUT:    Accordian: 17 mL    Voided: 850 mL  Total OUT: 867 mL    Total NET: -117 mL           CAPILLARY BLOOD GLUCOSE                      Neuro: NAD AO x3    Lungs:    CV:    ABD:     Ext: Dressing CDI  HV x1  5/5 FHL EHL GS TA SILT  SILT WWP B/L LE, sensation reportedly decreased right lateral thigh.    LABS   12 Jan 2018 07:17    139    |  102    |  9      ----------------------------<  124    3.9     |  26     |  0.85     Ca    8.7        12 Jan 2018 07:17                                   10.3   9.9   )-----------( 204      ( 12 Jan 2018 07:15 )             32.5                 [ ] Other Labs  [ ] None ordered            Please check or [place] when present:  •  Heart Failure:    [ ] Acute        [ ]  Acute on Chronic        [ ] Chronic         [ ] Diastolic     [ ]  Combined    •  PERCY:     [ ] ATN        [ ]  Renal medullary necrosis       [ ]  Renal cortical necrosis                  [ ] Other pathological Lesion:  •  CKD:  [ ] Stage I   [ ] Stage II  [ ] Stage III    [ ]Stage IV   [ ]  CKD V   [ ]  Other/Unspecified:    •  Abdominal Nutritional Status:   [ ] Malnutrition-See Nutrition note    [ ] Cachexia   [ ]  Other        [ ] Supplement ordered:            [ ] Morbid Obesity: BMI >=40         ASSESSMENT/PLAN:      STATUS POST: BETY, Revision Lami, TLIF L4-5 POD2  HV removed.  Awaiting TLSO brace.    CONTINUE:          [ ] PT    [ ] DVT PPX-SCD    [ ] Pain Mgt    [ ] Dispo plan-Home

## 2018-01-12 NOTE — PROGRESS NOTE ADULT - SUBJECTIVE AND OBJECTIVE BOX
Pain Management Progress Note - [facility] [phone]    HPI:  This is a 56 y/o with h/o HTN, Osteopenia, OA s/p R THR, Colon ca s/p partial colectomy with chemo in 2007, DVT after colectomy, back pain s/p PSF in 2004 with worsen back pain now s/p BETY/ Reinstrumentation L4-5, PSF/TLIF on 1/10/18.  Per I-stop, Tramadol 50mg q4h and Vicodin 7.5/300 q4h were prescribed but patient states that she dose not take them anymore because they did not help her pain. She takes only Ibuprofen for her pain ar home.  Patient reports that she had weakness LLE before her sx. She drinks 2 glasses of wine >4times/week and denies smoking.   Her PCA was discontinued yesterday and patient states that         Pertinent PMH: Pain at: ___Back ___Neck___Knee ___Hip ___Shoulder ___ Opioid tolerance    Pain is ___ sharp ____dull ___burning ___achy ___ Intensity: ____ mild ____mod ____severe     Location _____surgical site _____cervical _____lumbar ____abd _____upper ext____lower ext    Worse with ____activity ____movement _____physical therapy___ Rest    Improved with ____medication ____rest ____physical therapy    lactated ringers.  acetaminophen   Tablet  acetaminophen   Tablet.  HYDROmorphone  Injectable  diazepam    Tablet  aluminum hydroxide/magnesium hydroxide/simethicone Suspension  metoclopramide Injectable  ondansetron Injectable  docusate sodium  magnesium hydroxide Suspension  senna  oxyCODONE    IR  oxyCODONE    IR  pantoprazole    Tablet  calcium carbonate  625 mG + Vitamin D (OsCal 250 + D)  cyanocobalamin  cholecalciferol  amLODIPine   Tablet  clindamycin IVPB  BUpivacaine liposome 1.3% Injectable (no eMAR)  HYDROmorphone  Injectable  acetaminophen  IVPB.  acetaminophen   Tablet.  morphine PCA (5 mG/mL)  morphine PCA (5 mG/mL) Rescue Clinician Bolus  naloxone Injectable  ondansetron Injectable  (Floorstock)  HYDROmorphone  Injectable  oxyCODONE    IR  oxyCODONE    IR  HYDROmorphone  Injectable      ROS: Const:  _-__febrile   Eyes:___ENT:___CV: __-_chest pain  Resp: __-__sob  GI:__-_nausea __-_vomiting __-__abd pain ___npo ___clears ___full diet __bm  :___ Musk: +___pain __+_spasm  Skin:___ Neuro:  __-_ucjxxset__-_jgopzziai____ numbness _-__weakness ___paresthesia  Psych:___anxiety  Endo:___ Heme:___Allergy:___      01-12 @ 07:1776 mL/min/1.73M2    Hemoglobin: 10.3 g/dL (01-12 @ 07:15)  Hemoglobin: 9.9 g/dL (01-11 @ 07:14)  Hemoglobin: 11.5 g/dL (01-10 @ 15:45)    T(C): 37.6 (01-12-18 @ 05:07), Max: 37.9 (01-11-18 @ 17:46)  HR: 95 (01-12-18 @ 05:07) (85 - 105)  BP: 107/74 (01-12-18 @ 05:07) (107/74 - 121/78)  RR: 16 (01-12-18 @ 05:07) (16 - 16)  SpO2: 96% (01-12-18 @ 05:07) (95% - 98%)  Wt(kg): --     PHYSICAL EXAM:  Gen Appearance: _x__no acute distress _x__appropriate         Neuro: _x__SILT feet__x__ EOM Intact Psych: AAOX__, ___mood/affect appropriate        Eyes: _x__conjunctiva WNL  _____ Pupils equal and round        ENT: __x_ears and nose atraumatic___ Hearing grossly intact        Neck: _x__trachea midline, no visible masses ___thyroid without palpable mass    Resp: _x__Nml WOB____No tactile fremitus ___clear to auscultation    Cardio: _x__extremities free from edema ____pedal pulses palpable    GI/Abdomen: _x__soft ____x_ Nontender______Nondistended_____HSM    Lymphatic: ___no palpable nodes in neck  ___no palpable nodes calves and feet    Skin/Wound: ___Incision, __x_Dressing c/d/i,   ____surrounding tissues soft,  ___drain/chest tube present____    Muscular: EHL __5_/5  Gastrocnemius__5_/5    __x_absent clubbing/cyanosis         ASSESSMENT:  This is a 57y old Female with a history of:  M48.061  No h/o HF  Handoff  MEWS Score  HTN (hypertension)  Osteopenia  Deep vein thrombosis (DVT)  History of colon cancer  History of fusion of lumbar spine  History of fusion of lumbar spine  History of spinal surgery  History of strabismus surgery  History of tonsillectomy  PDA (patent ductus arteriosus)  Enlargement of tonsils  History of oophorectomy  History of total hip replacement  S/P partial colectomy        Recommended Treatment PLAN: Pain Management Progress Note - [facility] [phone]  Patient was seen at 09:30.  HPI:  This is a 56 y/o with h/o HTN, Osteopenia, OA s/p R THR, Colon ca s/p partial colectomy with chemo in 2007, DVT after colectomy, back pain s/p PSF in 2004 with worsen back pain now s/p BETY/ Reinstrumentation L4-5, PSF/TLIF on 1/10/18.  Per I-stop, Tramadol 50mg q4h and Vicodin 7.5/300 q4h were prescribed but patient states that she dose not take them anymore because they did not help her pain. She takes only Ibuprofen for her pain ar home.  Her PCA was discontinued yesterday and patient states that she is in pain but her pain is manageable with current regimen.        Pertinent PMH: Pain at: _x__Back ___Neck___Knee ___Hip ___Shoulder ___ Opioid tolerance    Pain is _x__ sharp ____dull ___burning __x_achy ___ Intensity: ____ mild ____mod ___x_severe     Location ____x_surgical site _____cervical ___x__lumbar ____abd _____upper ext____lower ext    Worse with _x___activity _x___movement ___x__physical therapy___ Rest    Improved with ___x_medication ___x_rest ____physical therapy    lactated ringers.  acetaminophen   Tablet  acetaminophen   Tablet.  HYDROmorphone  Injectable  diazepam    Tablet  aluminum hydroxide/magnesium hydroxide/simethicone Suspension  metoclopramide Injectable  ondansetron Injectable  docusate sodium  magnesium hydroxide Suspension  senna  oxyCODONE    IR  oxyCODONE    IR  pantoprazole    Tablet  calcium carbonate  625 mG + Vitamin D (OsCal 250 + D)  cyanocobalamin  cholecalciferol  amLODIPine   Tablet  clindamycin IVPB  BUpivacaine liposome 1.3% Injectable (no eMAR)  HYDROmorphone  Injectable  acetaminophen  IVPB.  acetaminophen   Tablet.  morphine PCA (5 mG/mL)  morphine PCA (5 mG/mL) Rescue Clinician Bolus  naloxone Injectable  ondansetron Injectable  (Floorstock)  HYDROmorphone  Injectable  oxyCODONE    IR  oxyCODONE    IR  HYDROmorphone  Injectable      ROS: Const:  _-__febrile   Eyes:___ENT:___CV: __-_chest pain  Resp: __-__sob  GI:__-_nausea __-_vomiting __-__abd pain ___npo ___clears ___full diet __bm  :___ Musk: +___pain __+_spasm  Skin:___ Neuro:  __-_wdvjnmnv__-_nsacihadz____ numbness _-__weakness ___paresthesia  Psych:___anxiety  Endo:___ Heme:___Allergy:___      01-12 @ 07:1776 mL/min/1.73M2    Hemoglobin: 10.3 g/dL (01-12 @ 07:15)  Hemoglobin: 9.9 g/dL (01-11 @ 07:14)  Hemoglobin: 11.5 g/dL (01-10 @ 15:45)    T(C): 37.6 (01-12-18 @ 05:07), Max: 37.9 (01-11-18 @ 17:46)  HR: 95 (01-12-18 @ 05:07) (85 - 105)  BP: 107/74 (01-12-18 @ 05:07) (107/74 - 121/78)  RR: 16 (01-12-18 @ 05:07) (16 - 16)  SpO2: 96% (01-12-18 @ 05:07) (95% - 98%)  Wt(kg): --     PHYSICAL EXAM:  Gen Appearance: _x__no acute distress _x__appropriate         Neuro: _x__SILT feet__x__ EOM Intact Psych: AAOX__, ___mood/affect appropriate        Eyes: _x__conjunctiva WNL  _____ Pupils equal and round        ENT: __x_ears and nose atraumatic___ Hearing grossly intact        Neck: _x__trachea midline, no visible masses ___thyroid without palpable mass    Resp: _x__Nml WOB____No tactile fremitus ___clear to auscultation    Cardio: _x__extremities free from edema ____pedal pulses palpable    GI/Abdomen: _x__soft ____x_ Nontender______Nondistended_____HSM    Lymphatic: ___no palpable nodes in neck  ___no palpable nodes calves and feet    Skin/Wound: ___Incision, __x_Dressing c/d/i,   ____surrounding tissues soft,  ___drain/chest tube present____    Muscular: EHL __5_/5  Gastrocnemius__5_/5    __x_absent clubbing/cyanosis         ASSESSMENT:    This is a 56 y/o with h/o HTN, Osteopenia, OA s/p R THR, Colon ca s/p partial colectomy with chemo in 2007, DVT after colectomy, back pain s/p PSF in 2004 with worsen back pain now s/p BETY/ Reinstrumentation L4-5, PSF/TLIF on 1/10/18. she is doing ok with current pain regimen.      Recommended Treatment PLAN:  1. Continue current regimen   2. D/C home with Percocet 10/325, ordered in her pharmacy

## 2018-01-13 VITALS
DIASTOLIC BLOOD PRESSURE: 90 MMHG | RESPIRATION RATE: 17 BRPM | TEMPERATURE: 98 F | HEART RATE: 100 BPM | OXYGEN SATURATION: 94 % | SYSTOLIC BLOOD PRESSURE: 131 MMHG

## 2018-01-13 LAB
ANION GAP SERPL CALC-SCNC: 11 MMOL/L — SIGNIFICANT CHANGE UP (ref 5–17)
BASOPHILS NFR BLD AUTO: 0.2 % — SIGNIFICANT CHANGE UP (ref 0–2)
BUN SERPL-MCNC: 9 MG/DL — SIGNIFICANT CHANGE UP (ref 7–23)
CALCIUM SERPL-MCNC: 9 MG/DL — SIGNIFICANT CHANGE UP (ref 8.4–10.5)
CHLORIDE SERPL-SCNC: 104 MMOL/L — SIGNIFICANT CHANGE UP (ref 96–108)
CO2 SERPL-SCNC: 25 MMOL/L — SIGNIFICANT CHANGE UP (ref 22–31)
CREAT SERPL-MCNC: 0.75 MG/DL — SIGNIFICANT CHANGE UP (ref 0.5–1.3)
EOSINOPHIL NFR BLD AUTO: 1.3 % — SIGNIFICANT CHANGE UP (ref 0–6)
GLUCOSE SERPL-MCNC: 117 MG/DL — HIGH (ref 70–99)
HCT VFR BLD CALC: 33.8 % — LOW (ref 34.5–45)
HGB BLD-MCNC: 11.2 G/DL — LOW (ref 11.5–15.5)
LYMPHOCYTES # BLD AUTO: 17.6 % — SIGNIFICANT CHANGE UP (ref 13–44)
MCHC RBC-ENTMCNC: 29.9 PG — SIGNIFICANT CHANGE UP (ref 27–34)
MCHC RBC-ENTMCNC: 33.1 G/DL — SIGNIFICANT CHANGE UP (ref 32–36)
MCV RBC AUTO: 90.4 FL — SIGNIFICANT CHANGE UP (ref 80–100)
MONOCYTES NFR BLD AUTO: 8.7 % — SIGNIFICANT CHANGE UP (ref 2–14)
NEUTROPHILS NFR BLD AUTO: 72.2 % — SIGNIFICANT CHANGE UP (ref 43–77)
PLATELET # BLD AUTO: 223 K/UL — SIGNIFICANT CHANGE UP (ref 150–400)
POTASSIUM SERPL-MCNC: 4 MMOL/L — SIGNIFICANT CHANGE UP (ref 3.5–5.3)
POTASSIUM SERPL-SCNC: 4 MMOL/L — SIGNIFICANT CHANGE UP (ref 3.5–5.3)
RBC # BLD: 3.74 M/UL — LOW (ref 3.8–5.2)
RBC # FLD: 12.9 % — SIGNIFICANT CHANGE UP (ref 10.3–16.9)
SODIUM SERPL-SCNC: 140 MMOL/L — SIGNIFICANT CHANGE UP (ref 135–145)
WBC # BLD: 10.3 K/UL — SIGNIFICANT CHANGE UP (ref 3.8–10.5)
WBC # FLD AUTO: 10.3 K/UL — SIGNIFICANT CHANGE UP (ref 3.8–10.5)

## 2018-01-13 RX ORDER — DOCUSATE SODIUM 100 MG
1 CAPSULE ORAL
Qty: 0 | Refills: 0 | COMMUNITY
Start: 2018-01-13

## 2018-01-13 RX ORDER — SENNA PLUS 8.6 MG/1
2 TABLET ORAL
Qty: 0 | Refills: 0 | COMMUNITY
Start: 2018-01-13

## 2018-01-13 RX ADMIN — Medication 1 TABLET(S): at 09:56

## 2018-01-13 RX ADMIN — Medication 975 MILLIGRAM(S): at 06:29

## 2018-01-13 RX ADMIN — AMLODIPINE BESYLATE 5 MILLIGRAM(S): 2.5 TABLET ORAL at 06:30

## 2018-01-13 RX ADMIN — PREGABALIN 250 MICROGRAM(S): 225 CAPSULE ORAL at 09:53

## 2018-01-13 RX ADMIN — Medication 975 MILLIGRAM(S): at 14:15

## 2018-01-13 RX ADMIN — Medication 100 MILLIGRAM(S): at 06:30

## 2018-01-13 RX ADMIN — Medication 2000 UNIT(S): at 09:53

## 2018-01-13 RX ADMIN — Medication 975 MILLIGRAM(S): at 15:00

## 2018-01-13 RX ADMIN — Medication 975 MILLIGRAM(S): at 07:05

## 2018-01-13 RX ADMIN — PANTOPRAZOLE SODIUM 40 MILLIGRAM(S): 20 TABLET, DELAYED RELEASE ORAL at 06:31

## 2018-01-13 RX ADMIN — MAGNESIUM HYDROXIDE 30 MILLILITER(S): 400 TABLET, CHEWABLE ORAL at 09:54

## 2018-01-13 NOTE — PROGRESS NOTE ADULT - SUBJECTIVE AND OBJECTIVE BOX
Patient seen and examined at bedside.     SUBJECTIVE: No acute events overnight.  Pain tolerable.    Denies Chest Pain/SOB.    Denies Headache.    Denies Nausea/vomiting.      OBJECTIVE:   T(C): 37.3 (01-13-18 @ 05:43), Max: 38.3 (01-12-18 @ 21:27)  T(F): 99.1 (01-13-18 @ 05:43), Max: 101 (01-12-18 @ 21:27)  HR: 96 (01-13-18 @ 05:43) (89 - 101)  BP: 124/87 (01-13-18 @ 05:43) (103/68 - 124/87)  RR:  (16 - 17)  SpO2:  (94% - 95%)  Wt(kg): --    NAD, non labored respirations  Affected extremity:          Dressing: clean/dry/intact          Drains: none         Sensation: [x ] intact to light touch  [ ] decreased                              Vascular: [x ] warm well perfused; capillary refill <3 seconds          Motor exam: [x ]         [ ] Upper extremity                   Beau (c5)   Bi(c5/6)   WE(c6)   EE(c7)    (c8)                                                R           5              5            5             5             5                                               L            5              5            5             5            5         [ x] Lower extremity                   IP(L2)     Q(L3)     TA(L4)     EHL(L5)     GS(s1)                                                 R          5           5          5            5              5                                               L           5           5         5             5              5                                     10.3<L>  9.9   )-------------------( 204      ( 01-12 @ 07:15 )                 32.5<L>    I&O's Detail    12 Jan 2018 07:01  -  13 Jan 2018 07:00  --------------------------------------------------------  IN:    Oral Fluid: 1100 mL  Total IN: 1100 mL    OUT:    Accordian: 17 mL    Voided: 1450 mL  Total OUT: 1467 mL    Total NET: -367 mL          A/P :  57y Female s/p Rev Lumbar Laminectomy/PSF   and TLIF L4-5  -    Pain control + bowel regimen  -    DVT ppx: SCDs    -    Periop abx    -    ADAT  -    Check AM labs  -    Weight bearing status:   WBAT        -    Physical Therapy  -    Resume home meds  -    Dispo planning

## 2018-01-29 PROCEDURE — 86901 BLOOD TYPING SEROLOGIC RH(D): CPT

## 2018-01-29 PROCEDURE — C1713: CPT

## 2018-01-29 PROCEDURE — 88300 SURGICAL PATH GROSS: CPT

## 2018-01-29 PROCEDURE — C1769: CPT

## 2018-01-29 PROCEDURE — 88304 TISSUE EXAM BY PATHOLOGIST: CPT

## 2018-01-29 PROCEDURE — 97116 GAIT TRAINING THERAPY: CPT

## 2018-01-29 PROCEDURE — 85025 COMPLETE CBC W/AUTO DIFF WBC: CPT

## 2018-01-29 PROCEDURE — 86850 RBC ANTIBODY SCREEN: CPT

## 2018-01-29 PROCEDURE — 76000 FLUOROSCOPY <1 HR PHYS/QHP: CPT

## 2018-01-29 PROCEDURE — 86900 BLOOD TYPING SEROLOGIC ABO: CPT

## 2018-01-29 PROCEDURE — 80048 BASIC METABOLIC PNL TOTAL CA: CPT

## 2018-01-29 PROCEDURE — 81001 URINALYSIS AUTO W/SCOPE: CPT

## 2018-01-29 PROCEDURE — 95940 IONM IN OPERATNG ROOM 15 MIN: CPT

## 2018-01-29 PROCEDURE — 36415 COLL VENOUS BLD VENIPUNCTURE: CPT

## 2018-01-29 PROCEDURE — C1889: CPT

## 2018-02-22 NOTE — CONSULT NOTE ADULT - CONSULT REQUESTED BY NAME
Dr. Salazar
GYN Fever>100.4/Bleeding that does not stop/Unable to Urinate/Inability to Tolerate Liquids or Foods

## 2018-05-04 DIAGNOSIS — Z01.818 ENCOUNTER FOR OTHER PREPROCEDURAL EXAMINATION: ICD-10-CM

## 2018-05-05 PROBLEM — I82.409 ACUTE EMBOLISM AND THROMBOSIS OF UNSPECIFIED DEEP VEINS OF UNSPECIFIED LOWER EXTREMITY: Chronic | Status: ACTIVE | Noted: 2017-09-25

## 2018-05-05 PROBLEM — M85.80 OTHER SPECIFIED DISORDERS OF BONE DENSITY AND STRUCTURE, UNSPECIFIED SITE: Chronic | Status: ACTIVE | Noted: 2017-09-25

## 2018-05-05 PROBLEM — Z85.038 PERSONAL HISTORY OF OTHER MALIGNANT NEOPLASM OF LARGE INTESTINE: Chronic | Status: ACTIVE | Noted: 2017-09-25

## 2022-08-18 NOTE — PATIENT PROFILE ADULT. - FUNCTIONAL SCREEN CURRENT LEVEL: SWALLOWING (IF SCORE 2 OR MORE FOR ANY ITEM, CONSULT REHAB SERVICES), MLM)
Advised pt. I would fax the office note, he should go to the office who ordered his cervical spine x-ray and MRI as they would have more information in their notes.     Faxed office note to fax number provided.   (0) swallows foods/liquids without difficulty

## 2022-09-22 NOTE — H&P ADULT - PMH
Deep vein thrombosis (DVT)  after colon surgery  History of colon cancer  2007 Chemo  HTN (hypertension)    Osteopenia no
